# Patient Record
Sex: MALE | Race: BLACK OR AFRICAN AMERICAN | NOT HISPANIC OR LATINO | Employment: FULL TIME | URBAN - METROPOLITAN AREA
[De-identification: names, ages, dates, MRNs, and addresses within clinical notes are randomized per-mention and may not be internally consistent; named-entity substitution may affect disease eponyms.]

---

## 2021-05-28 ENCOUNTER — RECORDS - HEALTHEAST (OUTPATIENT)
Dept: ADMINISTRATIVE | Facility: CLINIC | Age: 46
End: 2021-05-28

## 2021-09-01 ENCOUNTER — APPOINTMENT (OUTPATIENT)
Dept: FAMILY MEDICINE | Facility: CLINIC | Age: 46
End: 2021-09-01
Payer: COMMERCIAL

## 2021-09-01 ENCOUNTER — LAB REQUISITION (OUTPATIENT)
Dept: LAB | Facility: CLINIC | Age: 46
End: 2021-09-01

## 2021-09-01 LAB — SARS-COV-2 RNA RESP QL NAA+PROBE: NEGATIVE

## 2021-09-01 PROCEDURE — U0003 INFECTIOUS AGENT DETECTION BY NUCLEIC ACID (DNA OR RNA); SEVERE ACUTE RESPIRATORY SYNDROME CORONAVIRUS 2 (SARS-COV-2) (CORONAVIRUS DISEASE [COVID-19]), AMPLIFIED PROBE TECHNIQUE, MAKING USE OF HIGH THROUGHPUT TECHNOLOGIES AS DESCRIBED BY CMS-2020-01-R: HCPCS | Performed by: INTERNAL MEDICINE

## 2021-10-16 ENCOUNTER — HEALTH MAINTENANCE LETTER (OUTPATIENT)
Age: 46
End: 2021-10-16

## 2021-11-24 ENCOUNTER — APPOINTMENT (OUTPATIENT)
Dept: FAMILY MEDICINE | Facility: CLINIC | Age: 46
End: 2021-11-24
Payer: COMMERCIAL

## 2021-11-24 ENCOUNTER — LAB REQUISITION (OUTPATIENT)
Dept: LAB | Facility: CLINIC | Age: 46
End: 2021-11-24

## 2021-11-24 LAB — SARS-COV-2 RNA RESP QL NAA+PROBE: NEGATIVE

## 2021-11-24 PROCEDURE — U0005 INFEC AGEN DETEC AMPLI PROBE: HCPCS | Performed by: INTERNAL MEDICINE

## 2021-12-30 ENCOUNTER — NURSE TRIAGE (OUTPATIENT)
Dept: NURSING | Facility: CLINIC | Age: 46
End: 2021-12-30
Payer: COMMERCIAL

## 2021-12-30 ENCOUNTER — VIRTUAL VISIT (OUTPATIENT)
Dept: INTERNAL MEDICINE | Facility: CLINIC | Age: 46
End: 2021-12-30
Payer: COMMERCIAL

## 2021-12-30 ENCOUNTER — LAB (OUTPATIENT)
Dept: FAMILY MEDICINE | Facility: CLINIC | Age: 46
End: 2021-12-30
Payer: COMMERCIAL

## 2021-12-30 DIAGNOSIS — J06.9 VIRAL URI WITH COUGH: ICD-10-CM

## 2021-12-30 DIAGNOSIS — J06.9 VIRAL URI WITH COUGH: Primary | ICD-10-CM

## 2021-12-30 LAB — SARS-COV-2 RNA RESP QL NAA+PROBE: NEGATIVE

## 2021-12-30 PROCEDURE — U0005 INFEC AGEN DETEC AMPLI PROBE: HCPCS

## 2021-12-30 PROCEDURE — U0003 INFECTIOUS AGENT DETECTION BY NUCLEIC ACID (DNA OR RNA); SEVERE ACUTE RESPIRATORY SYNDROME CORONAVIRUS 2 (SARS-COV-2) (CORONAVIRUS DISEASE [COVID-19]), AMPLIFIED PROBE TECHNIQUE, MAKING USE OF HIGH THROUGHPUT TECHNOLOGIES AS DESCRIBED BY CMS-2020-01-R: HCPCS

## 2021-12-30 PROCEDURE — 99213 OFFICE O/P EST LOW 20 MIN: CPT | Mod: TEL | Performed by: NURSE PRACTITIONER

## 2021-12-30 NOTE — PROGRESS NOTES
"Nicolas is a 46 year old who is being evaluated via a billable telephone visit.      What phone number would you like to be contacted at? [phone]  How would you like to obtain your AVS? [facility]    Assessment & Plan   Problem List Items Addressed This Visit     None      Visit Diagnoses     Viral URI with cough    -  Primary    Relevant Orders    Symptomatic; Unknown COVID-19 Virus (Coronavirus) by PCR                        No follow-ups on file.    Jenny Neil NP  [facility]    Bg Valenzuela is a 46 year old who presents for the following health issues     HPI       Concern for COVID-19  About how many days ago did these symptoms start? 5  Is this your first visit for this illness? Yes  In the 14 days before your symptoms started, have you had close contact with someone with COVID-19 (Coronavirus)? Yes, I have been in contact with someone who has COVID-19/Coronavirus (confirmed by lab test).  Do you have a fever or chills? Yes, I felt feverish or had chills  Are you having new or worsening difficulty breathing? No  Do you have new or worsening cough? Yes, it's a dry cough.   Have you had any new or unexplained body aches? YES    Have you experienced any of the following NEW symptoms?    Headache: YES    Sore throat: YES    Loss of taste or smell: No    Chest pain: No    Diarrhea: YES    Rash: No  What treatments have you tried? Advil  Who do you live with? Wife and children  Are you, or a household member, a healthcare worker or a ? YES  Do you live in a nursing home, group home, or shelter? No  Do you have a way to get food/medications if quarantined? Yes, I have a friend or family member who can help me.      Review of Systems   Unremarkable other than listed above and below         Objective    Vitals - Patient Reported  Weight (Patient Reported): 83.9 kg (185 lb)  Height (Patient Reported): 170.2 cm (5' 7\")  BMI (Based on Pt Reported Ht/Wt): " 28.97        Physical Exam   healthy, alert and no distress  PSYCH: Alert and oriented times 3; coherent speech, normal   rate and volume, able to articulate logical thoughts, able   to abstract reason, no tangential thoughts, no hallucinations   or delusions  His affect is normal  RESP: No cough, no audible wheezing, able to talk in full sentences  Remainder of exam unable to be completed due to telephone visits                Phone call duration: 3 minutes

## 2021-12-30 NOTE — TELEPHONE ENCOUNTER
Employee having ST and ha.    COVID 19 Nurse Triage Plan/Patient Instructions    Please be aware that novel coronavirus (COVID-19) may be circulating in the community. If you develop symptoms such as fever, cough, or SOB or if you have concerns about the presence of another infection including coronavirus (COVID-19), please contact your health care provider or visit [email or web address].     Disposition/Instructions    Virtual Visit with provider recommended. Reference Visit Selection Guide.    Thank you for taking steps to prevent the spread of this virus.  o Limit your contact with others.  o Wear a simple mask to cover your cough.  o Wash your hands well and often.    Resources    [facility]: About COVID-19: [email or web address]    CDC: What to Do If You're Sick: www.cdc.gov/coronavirus/2019-ncov/about/steps-when-sick.html    CDC: Ending Home Isolation: www.cdc.gov/coronavirus/2019-ncov/hcp/disposition-in-home-patients.html     CDC: Caring for Someone: www.cdc.gov/coronavirus/2019-ncov/if-you-are-sick/care-for-someone.html     [facility]: Interim Guidance for Hospital Discharge to Home: www.health.[facility].mn.us/diseases/coronavirus/hcp/hospdischarge.pdf    [facility] clinical trials (COVID-19 research studies): clinicalaffairs.[facility]-clinical-trials     Below are the COVID-19 hotlines at the [facility] of Health ([facility]). Interpreters are available.   o For health questions: Call 007-190-4971 or 1-381.407.5711 (7 a.m. to 7 p.m.)  o For questions about schools and childcare: Call 442-538-3382 or 1-733.829.8964 (7 a.m. to 7 p.m.)                     Reason for Disposition    [1] COVID-19 infection suspected by caller or triager AND [2] mild symptoms (cough, fever, or others) AND [3] negative COVID-19 rapid test    Additional Information    Negative: SEVERE difficulty breathing (e.g., struggling for each breath, speaks in single words)    Negative: Difficult to awaken or  acting confused (e.g., disoriented, slurred speech)    Negative: Bluish (or gray) lips or face now    Negative: Shock suspected (e.g., cold/pale/clammy skin, too weak to stand, low BP, rapid pulse)    Negative: Sounds like a life-threatening emergency to the triager    Negative: [1] COVID-19 exposure AND [2] no symptoms    Negative: COVID-19 vaccine reaction suspected (e.g., fever, headache, muscle aches) occurring 1 to 3 days after getting vaccine    Negative: COVID-19 vaccine, questions about    Negative: [1] Lives with someone known to have influenza (flu test positive) AND [2] flu-like symptoms (e.g., cough, runny nose, sore throat, SOB; with or without fever)    Negative: [1] Adult with possible COVID-19 symptoms AND [2] triager concerned about severity of symptoms or other causes    Negative: COVID-19 and breastfeeding, questions about    Negative: SEVERE or constant chest pain or pressure (Exception: mild central chest pain, present only when coughing)    Negative: MODERATE difficulty breathing (e.g., speaks in phrases, SOB even at rest, pulse 100-120)    Negative: [1] Headache AND [2] stiff neck (can't touch chin to chest)    Negative: MILD difficulty breathing (e.g., minimal/no SOB at rest, SOB with walking, pulse <100)    Negative: Chest pain or pressure    Negative: Patient sounds very sick or weak to the triager    Negative: Fever > 103 F (39.4 C)    Negative: [1] Fever > 101 F (38.3 C) AND [2] age > 60 years    Negative: [1] Fever > 100.0 F (37.8 C) AND [2] bedridden (e.g., nursing home patient, CVA, chronic illness, recovering from surgery)    Negative: HIGH RISK for severe COVID complications (e.g., age > 64 years, obesity with BMI > 25, pregnant, chronic lung disease or other chronic medical condition)  (Exception: Already seen by PCP and no new or worsening symptoms.)    Negative: [1] HIGH RISK patient AND [2] influenza is widespread in the community AND [3] ONE OR MORE respiratory symptoms: cough,  sore throat, runny or stuffy nose    Negative: [1] HIGH RISK patient AND [2] influenza exposure within the last 7 days AND [3] ONE OR MORE respiratory symptoms: cough, sore throat, runny or stuffy nose    Protocols used: CORONAVIRUS (COVID-19) DIAGNOSED OR [id number]-H-GO 8.25.2021

## 2022-01-02 ENCOUNTER — OFFICE VISIT (OUTPATIENT)
Dept: FAMILY MEDICINE | Facility: CLINIC | Age: 47
End: 2022-01-02
Payer: COMMERCIAL

## 2022-01-02 DIAGNOSIS — Z53.9 ERRONEOUS ENCOUNTER--DISREGARD: Primary | ICD-10-CM

## 2022-02-22 ENCOUNTER — TRANSFERRED RECORDS (OUTPATIENT)
Dept: MULTI SPECIALTY CLINIC | Facility: CLINIC | Age: 47
End: 2022-02-22

## 2022-05-23 ENCOUNTER — LAB REQUISITION (OUTPATIENT)
Dept: LAB | Facility: CLINIC | Age: 47
End: 2022-05-23

## 2022-05-23 LAB — SARS-COV-2 RNA RESP QL NAA+PROBE: POSITIVE

## 2022-05-23 PROCEDURE — U0003 INFECTIOUS AGENT DETECTION BY NUCLEIC ACID (DNA OR RNA); SEVERE ACUTE RESPIRATORY SYNDROME CORONAVIRUS 2 (SARS-COV-2) (CORONAVIRUS DISEASE [COVID-19]), AMPLIFIED PROBE TECHNIQUE, MAKING USE OF HIGH THROUGHPUT TECHNOLOGIES AS DESCRIBED BY CMS-2020-01-R: HCPCS | Performed by: INTERNAL MEDICINE

## 2022-10-01 ENCOUNTER — HEALTH MAINTENANCE LETTER (OUTPATIENT)
Age: 47
End: 2022-10-01

## 2022-11-22 ENCOUNTER — TELEPHONE (OUTPATIENT)
Dept: FAMILY MEDICINE | Facility: CLINIC | Age: 47
End: 2022-11-22

## 2022-11-22 NOTE — TELEPHONE ENCOUNTER
Call patient and notified him of provider's response. He verbalized understanding.     Alba Gordon RN   [facility]

## 2022-11-22 NOTE — TELEPHONE ENCOUNTER
Noted. As long as patient feels like he can handle his breathing at home then should keep appointment for tomorrow.   Naomie Ordaz PA-C

## 2022-11-22 NOTE — TELEPHONE ENCOUNTER
Called patient. He is having a lot of wheezing. Has hx of asthma. He normally does not need to use his albuterol rescue inhaler or nebulizer. Any movement such as walking causes shortness of breath, he has to stop to catch his breath. States that he has had these episodes in the past and usually given Z-romaine. Inhaler and neb not helping. This is the fourth week and the wheezing is waking him up at night. He declines any chest pain, maybe a little bit of chest tightness. He has a lot of congestion. No fever. Little bit of cough. Pt states that he works for [employer] and tests every week for covid-19. His last test was negative. Pt states that he tried going to  and no one is taking patients. He went to 3 UC's and they turned him away. [facility] and [facility]. Took 6 hours and no openings. There is a line out the door with a mandatory 3 hour wait.     Alba Gordon RN   [facility]

## 2022-11-22 NOTE — TELEPHONE ENCOUNTER
Please call and triage patient.     Patient is new to me and scheduled for asthma and chest pain tomorrow.   Naomie Ordaz PA-C

## 2022-11-23 ENCOUNTER — OFFICE VISIT (OUTPATIENT)
Dept: FAMILY MEDICINE | Facility: CLINIC | Age: 47
End: 2022-11-23
Payer: COMMERCIAL

## 2022-11-23 VITALS
BODY MASS INDEX: 29.35 KG/M2 | HEIGHT: 66 IN | WEIGHT: 182.6 LBS | HEART RATE: 78 BPM | DIASTOLIC BLOOD PRESSURE: 74 MMHG | SYSTOLIC BLOOD PRESSURE: 129 MMHG | TEMPERATURE: 98.2 F | OXYGEN SATURATION: 98 %

## 2022-11-23 DIAGNOSIS — J45.21 MILD INTERMITTENT ASTHMA WITH ACUTE EXACERBATION: Primary | ICD-10-CM

## 2022-11-23 PROBLEM — J45.20 MILD INTERMITTENT ASTHMA WITHOUT COMPLICATION: Status: ACTIVE | Noted: 2022-11-23

## 2022-11-23 PROCEDURE — 99204 OFFICE O/P NEW MOD 45 MIN: CPT | Performed by: PHYSICIAN ASSISTANT

## 2022-11-23 RX ORDER — PREDNISONE 20 MG/1
TABLET ORAL
Qty: 20 TABLET | Refills: 0 | Status: SHIPPED | OUTPATIENT
Start: 2022-11-23 | End: 2023-05-11

## 2022-11-23 RX ORDER — ALBUTEROL SULFATE 90 UG/1
2 AEROSOL, METERED RESPIRATORY (INHALATION)
COMMUNITY
Start: 2022-06-02 | End: 2022-11-23

## 2022-11-23 RX ORDER — ALBUTEROL SULFATE 90 UG/1
2 AEROSOL, METERED RESPIRATORY (INHALATION) EVERY 4 HOURS PRN
Qty: 18 G | Refills: 3 | Status: SHIPPED | OUTPATIENT
Start: 2022-11-23 | End: 2023-01-04

## 2022-11-23 ASSESSMENT — ASTHMA QUESTIONNAIRES
QUESTION_2 LAST FOUR WEEKS HOW OFTEN HAVE YOU HAD SHORTNESS OF BREATH: MORE THAN ONCE A DAY
ACT_TOTALSCORE: 8
QUESTION_1 LAST FOUR WEEKS HOW MUCH OF THE TIME DID YOUR ASTHMA KEEP YOU FROM GETTING AS MUCH DONE AT WORK, SCHOOL OR AT HOME: SOME OF THE TIME
ACT_TOTALSCORE: 8
QUESTION_3 LAST FOUR WEEKS HOW OFTEN DID YOUR ASTHMA SYMPTOMS (WHEEZING, COUGHING, SHORTNESS OF BREATH, CHEST TIGHTNESS OR PAIN) WAKE YOU UP AT NIGHT OR EARLIER THAN USUAL IN THE MORNING: FOUR OR MORE NIGHTS A WEEK
QUESTION_5 LAST FOUR WEEKS HOW WOULD YOU RATE YOUR ASTHMA CONTROL: NOT CONTROLLED AT ALL
QUESTION_4 LAST FOUR WEEKS HOW OFTEN HAVE YOU USED YOUR RESCUE INHALER OR NEBULIZER MEDICATION (SUCH AS ALBUTEROL): ONE OR TWO TIMES PER DAY

## 2022-11-23 NOTE — PROGRESS NOTES
"  Assessment & Plan     Mild intermittent asthma with acute exacerbation  - Acute asthma exacerbation of unknown origin. Refilled patient's albuterol prescription as his current inhaler was . Antibiotics not needed at this time due to lack of infection symptoms. Prednisone taper to assist in opening airways and reducing inflammation. Patient education given to return if symptoms worsen or do not resolve following prednisone treatment.   - albuterol (PROAIR HFA/PROVENTIL HFA/VENTOLIN HFA) 108 (90 Base) MCG/ACT inhaler; Inhale 2 puffs into the lungs every 4 hours as needed for shortness of breath / dyspnea or wheezing  - predniSONE (DELTASONE) 20 MG tablet; Take 3 tabs by mouth daily x 3 days, then 2 tabs daily x 3 days, then 1 tab daily x 3 days, then 1/2 tab daily x 3 days.             BMI:   Estimated body mass index is 29.7 kg/m  as calculated from the following:    Height as of this encounter: 1.67 m (5' 5.75\").    Weight as of this encounter: 82.8 kg (182 lb 9.6 oz).    Not addressed at this visit.         No follow-ups on file.    YAZMIN Govea PA-C  [facility]  The student RAJESH Govea acted as a scribe and the encounter documented above was completely performed by myself and the documentation reflects the work I have performed today.   Naomie Ordaz PA-C       Subjective   Nicolas is a 47 year old, presenting for the following health issues:  Asthma and Health Maintenance      History of Present Illness     Asthma:  He presents for follow up of asthma.  He has some cough, some wheezing, and some shortness of breath. He is using a relief medication every 4 hours. He does not have a controller medication. Patient is aware of the following triggers: unaware of any triggers. The patient has not had a visit to the Emergency Room, Urgent Care or Hospital due to asthma since the last clinic visit.       Nicolas was diagnosed with asthma in his late 30s. " "He has a family history of childhood onset asthma in his brother. Nicolas has an albuterol 90 mcg inhaler that he typically uses only once per year. Three weeks ago Nicolas noticed his asthma symptoms were worsening to include more coughing, wheezing and chest tightness. He had no changes to his environment and had no known sick contacts at the time. Nicolas gets tested for COVID once weekly at work and has tested negative for the last three weeks. Nicolas gets short of breath upon talking or walking for longer periods of time. He has a dry cough with no sputum output. He is noticing more congestion and chest tightness. His symptoms are worse in the evening and wake him unit(s)p 4-5 times a night. He has been using his albuterol inhaler three times a day with little to no relief. He denies ear pain or hearing loss, fever, and a sore throat. Patient denies recent travel and recent leg pain.           Review of Systems   As above.         Objective    /74 (BP Location: Right arm, Patient Position: Chair, Cuff Size: Adult Large)   Pulse 78   Temp 98.2  F (36.8  C) (Oral)   Ht 1.67 m (5' 5.75\")   Wt 82.8 kg (182 lb 9.6 oz)   SpO2 98%   BMI 29.70 kg/m    Body mass index is 29.7 kg/m .  Physical Exam  Constitutional:       General: He is not in acute distress.     Appearance: Normal appearance.   HENT:      Right Ear: Tympanic membrane, ear canal and external ear normal.      Left Ear: Tympanic membrane, ear canal and external ear normal.      Mouth/Throat:      Mouth: Mucous membranes are moist.      Pharynx: Oropharynx is clear.   Eyes:      General:         Right eye: No discharge.         Left eye: No discharge.      Comments: Eyes grossly normal. Negative for erythema and discharge.    Cardiovascular:      Rate and Rhythm: Normal rate and regular rhythm.   Pulmonary:      Effort: No respiratory distress.      Breath sounds: Examination of the right-middle field reveals wheezing. Examination of the left-middle " field reveals wheezing. Examination of the right-lower field reveals wheezing. Examination of the left-lower field reveals wheezing. Decreased breath sounds and wheezing (Expiratory wheezing bilaterally. ) present.   Lymphadenopathy:      Cervical: No cervical adenopathy.   Neurological:      Mental Status: He is alert.   Psychiatric:         Mood and Affect: Mood normal.         Behavior: Behavior normal.      No retractions and patient able to speak in complete sentences. Coughing with deep breaths.

## 2023-01-04 DIAGNOSIS — J45.21 MILD INTERMITTENT ASTHMA WITH ACUTE EXACERBATION: Primary | ICD-10-CM

## 2023-01-04 RX ORDER — ALBUTEROL SULFATE 90 UG/1
2 AEROSOL, METERED RESPIRATORY (INHALATION) EVERY 4 HOURS PRN
Qty: 18 G | Refills: 3 | Status: SHIPPED | OUTPATIENT
Start: 2023-01-04 | End: 2023-05-11

## 2023-01-04 RX ORDER — FLUTICASONE PROPIONATE 110 UG/1
1 AEROSOL, METERED RESPIRATORY (INHALATION) 2 TIMES DAILY
Qty: 12 G | Refills: 1 | Status: SHIPPED | OUTPATIENT
Start: 2023-01-04 | End: 2023-05-11

## 2023-01-04 NOTE — TELEPHONE ENCOUNTER
Patient should start a daily steroid inhaler and be seen in clinic within 2 weeks. Any available provider. If shortness of breath acutely worsens he needs to be seen urgently. RN can send medication to preferred pharmacy. At this time I would try this before sending to pulmonology as this is the usual next step in treatment.   Naomie Ordaz PA-C

## 2023-01-04 NOTE — TELEPHONE ENCOUNTER
"Naomie,     Patient called regarding visit 11/23/22. Per pt stated asthma symptoms got better but not back again and worse. Per pt \"I was told to call back if my symptoms don't get better so I can get a medication that I would take daily to help and I think I might even need another dose of prednisone\". Pt also stated he would be willing to see pulmonology if you feel that he should.       [phone], ok for detailed vm.     Thanks,  ROBER Chawla  [facility]     "

## 2023-01-04 NOTE — TELEPHONE ENCOUNTER
Naomie,     Please route back to me only. I forgot to mention that pt stated he needs refill on his albuterol too. Cued if ok per provider.  I will call him after your route back with message below.     Thanks,  ROBER Chawla  [facility]

## 2023-02-05 ENCOUNTER — HEALTH MAINTENANCE LETTER (OUTPATIENT)
Age: 48
End: 2023-02-05

## 2023-04-03 ENCOUNTER — TELEPHONE (OUTPATIENT)
Dept: FAMILY MEDICINE | Facility: CLINIC | Age: 48
End: 2023-04-03

## 2023-04-03 NOTE — TELEPHONE ENCOUNTER
Patient called reporting stuffy nose from dog sitting.  Does not have symptoms prior to dog sitting.  Has tried Benadryl with little to no relief.  Seeking advise.  RN advised to try OTC Zyrtec or Claritin as directed.  If no improve, call back to clinic for further recommendation.  Patient agreed.    PRESTON Vazquez, RN  [facility]

## 2023-05-11 ENCOUNTER — OFFICE VISIT (OUTPATIENT)
Dept: FAMILY MEDICINE | Facility: CLINIC | Age: 48
End: 2023-05-11
Payer: COMMERCIAL

## 2023-05-11 ENCOUNTER — TELEPHONE (OUTPATIENT)
Dept: FAMILY MEDICINE | Facility: CLINIC | Age: 48
End: 2023-05-11

## 2023-05-11 VITALS
WEIGHT: 185.8 LBS | BODY MASS INDEX: 29.16 KG/M2 | DIASTOLIC BLOOD PRESSURE: 79 MMHG | SYSTOLIC BLOOD PRESSURE: 139 MMHG | RESPIRATION RATE: 22 BRPM | OXYGEN SATURATION: 96 % | HEIGHT: 67 IN | HEART RATE: 84 BPM

## 2023-05-11 DIAGNOSIS — J45.41 MODERATE PERSISTENT ASTHMA WITH EXACERBATION: Primary | ICD-10-CM

## 2023-05-11 DIAGNOSIS — J30.81 ALLERGIC RHINITIS DUE TO ANIMALS: ICD-10-CM

## 2023-05-11 DIAGNOSIS — J45.30 MILD PERSISTENT ASTHMA, UNSPECIFIED WHETHER COMPLICATED: Primary | ICD-10-CM

## 2023-05-11 PROCEDURE — 99214 OFFICE O/P EST MOD 30 MIN: CPT | Performed by: NURSE PRACTITIONER

## 2023-05-11 RX ORDER — PREDNISONE 20 MG/1
40 TABLET ORAL DAILY
Qty: 10 TABLET | Refills: 0 | Status: SHIPPED | OUTPATIENT
Start: 2023-05-11 | End: 2023-05-16

## 2023-05-11 RX ORDER — ALBUTEROL SULFATE 90 UG/1
2 AEROSOL, METERED RESPIRATORY (INHALATION) EVERY 6 HOURS PRN
Qty: 18 G | Refills: 3 | Status: SHIPPED | OUTPATIENT
Start: 2023-05-11

## 2023-05-11 RX ORDER — FLUTICASONE PROPIONATE AND SALMETEROL 250; 50 UG/1; UG/1
1 POWDER RESPIRATORY (INHALATION) EVERY 12 HOURS
Qty: 1 EACH | Refills: 3 | Status: SHIPPED | OUTPATIENT
Start: 2023-05-11 | End: 2023-06-10

## 2023-05-11 RX ORDER — FLUTICASONE PROPIONATE 110 UG/1
1 AEROSOL, METERED RESPIRATORY (INHALATION) 2 TIMES DAILY
Qty: 12 G | Refills: 2 | Status: SHIPPED | OUTPATIENT
Start: 2023-05-11 | End: 2023-05-11

## 2023-05-11 ASSESSMENT — ENCOUNTER SYMPTOMS
SINUS PRESSURE: 1
VOMITING: 0
EYE ITCHING: 0
EYE DISCHARGE: 0
NAUSEA: 0
SLEEP DISTURBANCE: 1
CHILLS: 0
EYE REDNESS: 0
FATIGUE: 1
SORE THROAT: 0
FEVER: 0
SHORTNESS OF BREATH: 1
WHEEZING: 1
EYE PAIN: 0
MYALGIAS: 0
HEADACHES: 0
PHOTOPHOBIA: 0
COUGH: 1
CHEST TIGHTNESS: 1

## 2023-05-11 NOTE — PATIENT INSTRUCTIONS
Speak to the pharmacist about how to use this medication when you pick it up and also you may watch videos on the Advair website or YouTube on how to use the Advair discus appropriately.    After you use the medication be sure to rinse your mouth out with water and spit it out to prevent steroid medicine from sitting on your tongue.  If you do not do this, you can get thrush which is a white patch on your tongue that will need to be treated.    May also use your regular albuterol inhaler 2 puffs up to 4 times daily if needed as a rescue inhaler.  Ideally, you would not need to use your rescue inhaler very frequently if you are taking your Advair regularly.  Take this daily even if you are feeling well.    Recheck  after prednisone is done, about 7 days or if you're not better after 1-2 days.      Take 1/2 dose prednisone (20 mg) now and start 40 mg tomorrow morning.      Religiously use your Flonase 2 sprays in each nostril daily in the evening.  Okay for daily Claritin or generic once a day at the time easiest remember to take it.     If you are still very congested with these, discussed at your follow-up appointment.  You may need an allergy referral if you are not better.    Treating your allergies does also reduce the severity of your asthma

## 2023-05-11 NOTE — TELEPHONE ENCOUNTER
Please return call: Dr. Sanchez has sent a prescription for 3 months for the flovent as this is a chronic medication.  For the prednisone medication, patient will have to be seen, evaluated, and treated based on his current symptoms.    Orders placed as requested.     Jayme Sanchez MD  [facility]  5/11/2023  1:50 PM    Diagnoses and all orders for this visit:    Mild persistent asthma, unspecified whether complicated  -     fluticasone (FLOVENT HFA) 110 MCG/ACT inhaler; Inhale 1 puff into the lungs 2 times daily

## 2023-05-11 NOTE — TELEPHONE ENCOUNTER
Patient Returning Call    Reason for call:  Pt states Dr. Sanchez is his provider, however, pt has not seen Dr. Sanchez at [facility] (since coming).  Pt would like his prednisone refilled by Dr. Sanchez and a steroid inhaler.  The flovent cost over $100 and per pt is too expensive.     TC relayed pt would need to be seen as unsure if provider would order meds without an appt.  Pt then asked if provider could call him please.  TC relayed provider is seeing pt and will send message to him.      Information relayed to patient:  Understood    Patient has additional questions:  No    [facility] or would you prefer to receive a phone call?:   Patient would prefer a phone call     Okay to leave a detailed message?: Yes at Home number on file [phone] (home)    Call taken 5/11/23 at 1138 am by CHARLEE Hawk

## 2023-05-11 NOTE — PROGRESS NOTES
Assessment & Plan     Moderate persistent asthma with exacerbation    - fluticasone-salmeterol (ADVAIR) 250-50 MCG/ACT inhaler  Dispense: 1 each; Refill: 3  - Optichamber/Spacer Order for DME - ONLY FOR DME  - predniSONE (DELTASONE) 20 MG tablet  Dispense: 10 tablet; Refill: 0  - albuterol (PROAIR HFA/PROVENTIL HFA/VENTOLIN HFA) 108 (90 Base) MCG/ACT inhaler  Dispense: 18 g; Refill: 3    Allergic rhinitis due to animals     -Continue Claritin OTC.  Just picked this up today.    Ongoing asthma symptoms, unrelieved, that has been ongoing since November.  Patient was seen in clinic on 11/23 and was prescribed albuterol inhaler and prednisone which resolved symptoms.  However, patient states that symptoms flared again in February and he is now experiencing a dry cough, congestion, chest tightness, and shortness of breath with activity and rest.  + Wheezing throughout on exam.     Recently has been using his albuterol inhaler once per hour.  He was using a Flovent inhaler daily until about a week when he ran out.  Also costly co-pay of over $100.    He reports hx of seasonal allergies and allergies to animals. He has been taking OTC Claritin and flonase with no relief in wheezing and SOB symptoms.  Explained correlation between untreated allergies or undertreated allergies and worsening asthma symptoms.  Recommended 2 sprays in each nostril daily of Flonase from home supply religiously until feeling better and then may reduce to 1 spray in each nostril daily.     Due to no relief of asthma symptoms with plain Flovent 110 mcg dose and extremely frequent albuterol needs, will try Advair Diskus 250/50 with short prednisone burst to relieve symptoms currently.  Discussed options with pharmacist who ran several scenarios and Advair Diskus is $17 per month.  Patient is agreeable to taking this.    Advised to take 20mg of prednisone with Advair today as it is later in the day and then started Prednisone 40mg tomorrow.  Patient  is familiar with how to use Diskus inhalers and has in the past.  May use YouTube instructions or talk to pharmacist for further information.    Recheck if shortness of breath not relieved in the next 1 to 2 days or new fevers develop.  Rest.  Otherwise, follow-up in about 7 days if improving for recheck of lungs and asthma recheck.        30 minutes spent by me on the date of the encounter doing chart review, patient visit, documentation and discussion with other provider(s)         Return in about 1 week (around 5/18/2023) for If no better.    Bernice Bosch, [facility] PACHECO Valenzuela is a 47 year old male who presents to clinic today for the following health issues:  Chief Complaint   Patient presents with     Asthma     Has been having asthma flare up for last few months     HPI    Dry cough, congestion, chest tightness, SOB, and wheezing ongoing for since November 2022. Was seen in clinic on 11/23 and received prescription for albuterol inhaler and prednisone which resolved symptoms; however, symptoms returned in February 2023.     Waking every hour at night due to SOB and wheezing..     +Right ear fullness and itchy.. Rates 2/10.     Reports using Albuterol inhaler every hour. States to going through an inhaler in 1 week. Previously only needed one albuterol a year. Last dose 30 minutes ago.     Flovent previously prescribed. States to not being effective. Ran out 1 week ago . New prescription placed today, but reports does not want to purchase again. Wants new prescription for prednisone.     Hx of seasonal allergies. Taking Claritin daily.   +allergies to animals. States to being around family [place] more recently. Has had dog for 2+ years.    Hx of 1/2 pack/day smoking. Quit 3 years ago .     Weekly negative COVID testing through work.           Review of Systems   Constitutional: Positive for fatigue. Negative for chills and fever.   HENT: Positive for  "congestion and sinus pressure (frontal ). Negative for postnasal drip and sore throat.    Eyes: Negative for photophobia, pain, discharge, redness, itching and visual disturbance.   Respiratory: Positive for cough (dry ), chest tightness, shortness of breath (with rest and activity) and wheezing.    Cardiovascular: Negative for chest pain.   Gastrointestinal: Negative for nausea and vomiting.   Musculoskeletal: Negative for myalgias.   Allergic/Immunologic: Positive for environmental allergies.   Neurological: Negative for headaches.   Psychiatric/Behavioral: Positive for sleep disturbance.           Objective    /79 (BP Location: Right arm, Patient Position: Sitting, Cuff Size: Adult Large)   Pulse 84   Resp 22   Ht 1.702 m (5' 7\")   Wt 84.3 kg (185 lb 12.8 oz)   SpO2 96%   BMI 29.10 kg/m    Physical Exam  Constitutional:       General: He is awake. He is not in acute distress.     Appearance: Normal appearance. He is well-developed.   HENT:      Head: Normocephalic and atraumatic.      Right Ear: Hearing, tympanic membrane, ear canal and external ear normal.      Left Ear: Hearing, tympanic membrane, ear canal and external ear normal.      Nose:      Right Turbinates: Swollen and pale.      Left Turbinates: Swollen and pale.      Right Sinus: Frontal sinus tenderness present. No maxillary sinus tenderness.      Left Sinus: Frontal sinus tenderness present. No maxillary sinus tenderness.      Mouth/Throat:      Lips: Pink.      Mouth: Mucous membranes are moist.      Pharynx: Oropharynx is clear. Uvula midline. Posterior oropharyngeal erythema present.      Tonsils: 1+ on the right. 1+ on the left.   Pulmonary:      Effort: Tachypnea and respiratory distress present.      Breath sounds: Wheezing present.      Comments: Course wheezing throughout with audible wheezing  Lymphadenopathy:      Cervical: No cervical adenopathy.   Neurological:      General: No focal deficit present.      Mental Status: He is " alert and oriented to person, place, and time.   Psychiatric:         Attention and Perception: Attention normal.         Mood and Affect: Mood normal.         Behavior: Behavior is cooperative.

## 2023-05-30 NOTE — TELEPHONE ENCOUNTER
Advised patient per MD. The patient was not cooperative with the process at all once he was informed MD would not be prescribing Prednisone.     Patient would not allow author to find an appropriate appointment time. Patient was becoming very frustrated. Author was unable to assist the patient due to the behavior of the patient during our call. Patient asks to speak with MD directly and support staff agrees.     Patient declined Walk-In Care and stated that he has put seeking care off too long and now is having difficulty sleeping so not this is urgent. Author advised the patient that his failure to seek appropriate care and evaluation is not the responsibility of the provider or clinic.     Routing to PCP for review.    No protocol  Last ov 07/21/22  Last fill 05/01/2023  Does have future appt scheduled

## 2023-06-06 ENCOUNTER — OFFICE VISIT (OUTPATIENT)
Dept: INTERNAL MEDICINE | Facility: CLINIC | Age: 48
End: 2023-06-06
Payer: COMMERCIAL

## 2023-06-06 VITALS
DIASTOLIC BLOOD PRESSURE: 80 MMHG | OXYGEN SATURATION: 95 % | BODY MASS INDEX: 28.9 KG/M2 | SYSTOLIC BLOOD PRESSURE: 108 MMHG | WEIGHT: 184.1 LBS | HEART RATE: 89 BPM | TEMPERATURE: 98.2 F | HEIGHT: 67 IN

## 2023-06-06 DIAGNOSIS — J45.30 MILD PERSISTENT ASTHMA, UNSPECIFIED WHETHER COMPLICATED: Primary | ICD-10-CM

## 2023-06-06 PROCEDURE — 99214 OFFICE O/P EST MOD 30 MIN: CPT | Performed by: INTERNAL MEDICINE

## 2023-06-06 ASSESSMENT — ASTHMA QUESTIONNAIRES
ACT_TOTALSCORE: 16
QUESTION_3 LAST FOUR WEEKS HOW OFTEN DID YOUR ASTHMA SYMPTOMS (WHEEZING, COUGHING, SHORTNESS OF BREATH, CHEST TIGHTNESS OR PAIN) WAKE YOU UP AT NIGHT OR EARLIER THAN USUAL IN THE MORNING: TWO OR THREE NIGHTS A WEEK
EMERGENCY_ROOM_LAST_YEAR_TOTAL: TWO
QUESTION_1 LAST FOUR WEEKS HOW MUCH OF THE TIME DID YOUR ASTHMA KEEP YOU FROM GETTING AS MUCH DONE AT WORK, SCHOOL OR AT HOME: A LITTLE OF THE TIME
QUESTION_2 LAST FOUR WEEKS HOW OFTEN HAVE YOU HAD SHORTNESS OF BREATH: ONCE OR TWICE A WEEK
QUESTION_4 LAST FOUR WEEKS HOW OFTEN HAVE YOU USED YOUR RESCUE INHALER OR NEBULIZER MEDICATION (SUCH AS ALBUTEROL): TWO OR THREE TIMES PER WEEK
ACT_TOTALSCORE: 16
QUESTION_5 LAST FOUR WEEKS HOW WOULD YOU RATE YOUR ASTHMA CONTROL: SOMEWHAT CONTROLLED

## 2023-06-06 ASSESSMENT — PAIN SCALES - GENERAL: PAINLEVEL: NO PAIN (0)

## 2023-06-06 NOTE — PROGRESS NOTES
"  Assessment & Plan   Problem List Items Addressed This Visit        Respiratory    Mild persistent asthma, unspecified whether complicated - Primary     Patient presents for follow up of asthma. Had exacerbation that he was seen at [facility] for 5/11/23, improved with prednisone. Overall control had improved with Advair but still noting symptoms at night and needing daily albuterol.   - Increase Advair to BID  - Continue PRN albuterol   - F/u with me in 2 months            Prescription drug management  I spent a total of 32 minutes on the day of the visit.   Time spent by me doing chart review, history and exam, documentation and further activities per the note     BMI:   Estimated body mass index is 29.27 kg/m  as calculated from the following:    Height as of this encounter: 1.689 m (5' 6.5\").    Weight as of this encounter: 83.5 kg (184 lb 1.6 oz).   Weight management plan: Discussed healthy diet and exercise guidelines    FUTURE APPOINTMENTS:       - Follow-up visit in 2 months for routine physical    Diana Suha Navarro MD  [facility] PACHECO Valenzuela is a 47 year old, presenting for the following health issues:  Establish Care and RECHECK (From walk in care 5-11 )        6/6/2023     3:49 PM   Additional Questions   Roomed by KATIE Velez   Accompanied by n/a       History of Present Illness      Asthma Follow-Up    Was ACT completed today?  Yes        6/6/2023     3:43 PM   ACT Total Scores   ACT TOTAL SCORE (Goal Greater than or Equal to 20) 16   In the past 12 months, how many times did you visit the emergency room for your asthma without being admitted to the hospital? 2   In the past 12 months, how many times were you hospitalized overnight because of your asthma? 0       Please describe any recent triggers for your asthma: animal dander, humidity and air quality    Have you had any Emergency Room Visits, Urgent Care Visits, or Hospital Admissions since your last office " "visit?  Yes  Number of ER or Urgent Care visits for asthma: twice in the last year    Was seen in walk-in care 5/11 with acute asthma exacerbation in the setting of being out of Flovent.  Was using albuterol once per hour.  They did start him on Advair discus 250/50 BID with a short prednisone burst to relieve symptoms.    Today he says prednisone resolved significant shortness of breath and daytime wheezing. Advair is helping, only using once a day (was unaware it was a BID med). Still using albuterol once most evenings when wheezing starts again. Able to walk at work without issue (works for [employer] and walks a lot).     Asthma initially triggered by cabin with mice and cats in his late 20s. Worse recently with increasing allergies since they got a [place] Melo. Claritin or zyrtec daily. Flonase at night.    Back surgery in early 20s and L shoulder (rotator cuff surgery) 4 years ago.     H/o pre-DM.     Colonoscopy 2/22/22. Three TAs and one HP. Plan repeat in 3 years.     He eats 0-1 servings of fruits and vegetables daily.He consumes 0 sweetened beverage(s) daily.He exercises with enough effort to increase his heart rate 10 to 19 minutes per day.  He exercises with enough effort to increase his heart rate 4 days per week.   He is taking medications regularly.    Review of Systems   Constitutional, HEENT, cardiovascular, pulmonary, gi and gu systems are negative, except as otherwise noted.      Objective    /80 (BP Location: Right arm, Patient Position: Sitting, Cuff Size: Adult Regular)   Pulse 89   Temp 98.2  F (36.8  C) (Oral)   Ht 1.689 m (5' 6.5\")   Wt 83.5 kg (184 lb 1.6 oz)   SpO2 95%   BMI 29.27 kg/m    Body mass index is 29.27 kg/m .  Physical Exam   GENERAL: healthy, alert and no distress  EYES: Eyes grossly normal to inspection and conjunctivae and sclerae normal  HENT: nose and mouth without ulcers or lesions  RESP: lungs clear to auscultation - no rales, rhonchi or wheezes  CV: " regular rate and rhythm, normal S1 S2, no S3 or S4, no murmur, click or rub, no peripheral edema and peripheral pulses strong  ABDOMEN: soft, nontender  MS: no gross musculoskeletal defects noted, no edema  SKIN: no suspicious lesions or rashes on exposed skin   NEURO: Normal strength and tone, mentation intact and speech normal  PSYCH: mentation appears normal, affect normal/bright

## 2023-06-06 NOTE — PATIENT INSTRUCTIONS
Can get off brand zyrtec (cetirizine) 10 mg daily and/or allegra (fexofenadine 180 mg).     Wild med saline sinus rinse daily.     Advair should be every 12 hours, morning and night.

## 2023-06-06 NOTE — Clinical Note
Please abstract the following data from this visit with this patient into the appropriate field in Epic:  Tests that can be patient reported without a hard copy:  {Quality Abstract List (Optional):840319}  Other Tests found in the patient's chart through Chart Review/Care Everywhere:  Colonoscopy done by this group [facility] on this date: 2/22/23  Note to Abstraction: If this section is blank, no results were found via Chart Review/Care Everywhere.

## 2023-06-07 NOTE — ASSESSMENT & PLAN NOTE
Patient presents for follow up of asthma. Had exacerbation that he was seen at [facility] for 5/11/23, improved with prednisone. Overall control had improved with Advair but still noting symptoms at night and needing daily albuterol.   - Increase Advair to BID  - Continue PRN albuterol   - F/u with me in 2 months

## 2023-07-09 ENCOUNTER — OFFICE VISIT (OUTPATIENT)
Dept: FAMILY MEDICINE | Facility: CLINIC | Age: 48
End: 2023-07-09
Payer: OTHER MISCELLANEOUS

## 2023-07-09 VITALS
OXYGEN SATURATION: 96 % | RESPIRATION RATE: 20 BRPM | TEMPERATURE: 98 F | DIASTOLIC BLOOD PRESSURE: 84 MMHG | SYSTOLIC BLOOD PRESSURE: 119 MMHG | HEART RATE: 92 BPM

## 2023-07-09 DIAGNOSIS — H61.23 BILATERAL IMPACTED CERUMEN: ICD-10-CM
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
## 2023-07-09 DIAGNOSIS — S50.862A INSECT BITE OF LEFT FOREARM, INITIAL ENCOUNTER: Primary | ICD-10-CM

## 2023-07-09 PROCEDURE — 69209 REMOVE IMPACTED EAR WAX UNI: CPT | Mod: 50 | Performed by: STUDENT IN AN ORGANIZED HEALTH CARE EDUCATION/TRAINING PROGRAM

## 2023-07-09 PROCEDURE — 99213 OFFICE O/P EST LOW 20 MIN: CPT | Mod: 25 | Performed by: STUDENT IN AN ORGANIZED HEALTH CARE EDUCATION/TRAINING PROGRAM

## 2023-07-09 RX ORDER — CEPHALEXIN 500 MG/1
500 CAPSULE ORAL 4 TIMES DAILY
Qty: 20 CAPSULE | Refills: 0 | Status: SHIPPED | OUTPATIENT
Start: 2023-07-09

## 2023-07-09 RX ORDER — MUPIROCIN 20 MG/G
OINTMENT TOPICAL 3 TIMES DAILY
Qty: 15 G | Refills: 0 | Status: SHIPPED | OUTPATIENT
Start: 2023-07-09 | End: 2023-07-09

## 2023-07-09 RX ORDER — MUPIROCIN 20 MG/G
OINTMENT TOPICAL 3 TIMES DAILY
Qty: 15 G | Refills: 0 | Status: SHIPPED | OUTPATIENT
Start: 2023-07-09

## 2023-07-09 RX ORDER — CEPHALEXIN 500 MG/1
500 CAPSULE ORAL 4 TIMES DAILY
Qty: 20 CAPSULE | Refills: 0 | Status: SHIPPED | OUTPATIENT
Start: 2023-07-09 | End: 2023-07-09

## 2023-07-09 NOTE — PROGRESS NOTES
Assessment & Plan     Insect bite of left forearm, initial encounter  Reassured by normal vitals, no systemic symptoms of infection, denies fevers. Plan to treat with keflex and Bactroban for localized skin infection given localized bite erythema and pain. Counseled on presenting for reevaluation if erythema spreads, if he develops fevers/chills. No current suspicion for systemic/tick borne infection given lack of fevers, myalgias, joint pain. Counseled on these symptoms as well.   - cephALEXin (KEFLEX) 500 MG capsule  Dispense: 20 capsule; Refill: 0  - mupirocin (BACTROBAN) 2 % external ointment  Dispense: 15 g; Refill: 0    Bilateral impacted cerumen  B/l cerumen disimpaction with irrigation, TMs clear. With pain improving s/p irrigation and centor score of 1, will not further work up sore throat/ear pain at this time.  - DE REMOVAL IMPACTED CERUMEN IRRIGATION/LVG UNILAT      Return in about 2 weeks (around 7/23/2023), or if symptoms worsen or fail to improve.    Pari Stanton MD  [facility]    Addendum  Call from patient-pharmacy closed, new pharmacy entered and medication ordred to new pharmacy as noted.    Subjective   Nicolas is a 47 year old, presenting for the following health issues:  Insect Bites (Left Forearm)    HPI     Noted have insect bites on L forearm.  Bite on Thursday.  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  Now has erythema and swelling at the site of the bite.    Has ear pain and throat pain as well  Maybe slightly more fatigued.  Started late Friday night.  Worsening over the last few days.    Has been having trouble sleeping due to pain in ear and irritation of throat.    No fevers  No known sick contacts.  No myalgias.    No nausea or vomiting.  Having some loose stools.    Decreased appetite.  Able to drink water.    Review of Systems    Constitutional, HEENT, cardiovascular, pulmonary, gi and gu systems are negative, except as otherwise noted.      Objective    /84   Pulse 92   Temp 98  F (36.7  C) (Tympanic)   Resp 20   SpO2 96%   There is no height or weight on file to calculate BMI.  Physical Exam   GENERAL: healthy, alert and no distress  EYES: Eyes grossly normal to inspection, PERRL and conjunctivae and sclerae normal  HENT: ear canals b/l impacted cerumen, TMs normal appearing s/p irrigation, nose and mouth without ulcers or lesions, tonsils clear without exudate, mildly erythematous but not edematous, no exudate.  NECK: no adenopathy, no asymmetry, masses, or scars and thyroid normal to palpation  RESP: lungs clear to auscultation - no rales, rhonchi or wheezes  CV: regular rate and rhythm, normal S1 S2, no S3 or S4, no murmur, click or rub, no peripheral edema and peripheral pulses strong  ABDOMEN: soft, nontender, no hepatosplenomegaly, no masses and bowel sounds normal  MS: no gross musculoskeletal defects noted, no edema  SKIN: no suspicious rashes; approximately 1.5 cm raised erythematous papule consistent with insect bite--surrounding erythema and painful to palpation, no streaking of erythema.   PSYCH: mentation appears normal, affect normal/bright    Discussed strep swab--centor score of 1, low likelihood, declines at this time.    ----- Service Performed and Documented by Resident or Fellow ------

## 2024-03-03 ENCOUNTER — HEALTH MAINTENANCE LETTER (OUTPATIENT)
Age: 49
End: 2024-03-03

## 2024-07-18 ENCOUNTER — TELEPHONE (OUTPATIENT)
Dept: INTERNAL MEDICINE | Facility: CLINIC | Age: 49
End: 2024-07-18
Payer: COMMERCIAL

## 2024-07-18 NOTE — TELEPHONE ENCOUNTER
Reason for Call:  Appointment Request    Patient requesting this type of appt:  ear pain and hard to hear for a week    Requested provider:  any    Reason patient unable to be scheduled: Not within requested timeframe    When does patient want to be seen/preferred time: Same day    Comments: patient wondering if he could be worked in today    Could we send this information to you in [employer] or would you prefer to receive a phone call?:   Patient would prefer a phone call   Okay to leave a detailed message?: Yes at Home number on file [phone] (home)    Call taken on 7/18/2024 at 12:28 PM by Duyen Story

## 2024-07-18 NOTE — TELEPHONE ENCOUNTER
Called patient. There are no openings in primary care today or tomorrow. Offered nurse triage, but patient prefers to go to Walk-In Care today.

## 2024-11-20 ENCOUNTER — ALLIED HEALTH/NURSE VISIT (OUTPATIENT)
Dept: FAMILY MEDICINE | Facility: CLINIC | Age: 49
End: 2024-11-20
Payer: COMMERCIAL

## 2024-11-20 DIAGNOSIS — Z23 NEEDS FLU SHOT: Primary | ICD-10-CM

## 2024-11-20 PROCEDURE — 99207 PR NO CHARGE NURSE ONLY: CPT

## 2024-11-20 PROCEDURE — 90471 IMMUNIZATION ADMIN: CPT

## 2024-11-20 PROCEDURE — 90673 RIV3 VACCINE NO PRESERV IM: CPT

## 2025-03-15 ENCOUNTER — HEALTH MAINTENANCE LETTER (OUTPATIENT)
Age: 50
End: 2025-03-15

## 2025-06-11 ENCOUNTER — OFFICE VISIT (OUTPATIENT)
Dept: URGENT CARE | Facility: URGENT CARE | Age: 50
End: 2025-06-11
Payer: COMMERCIAL

## 2025-06-11 VITALS
TEMPERATURE: 97.9 F | BODY MASS INDEX: 29.51 KG/M2 | DIASTOLIC BLOOD PRESSURE: 85 MMHG | SYSTOLIC BLOOD PRESSURE: 125 MMHG | OXYGEN SATURATION: 95 % | WEIGHT: 185.6 LBS | HEART RATE: 71 BPM | RESPIRATION RATE: 16 BRPM

## 2025-06-11 DIAGNOSIS — J01.00 ACUTE NON-RECURRENT MAXILLARY SINUSITIS: Primary | ICD-10-CM

## 2025-06-11 RX ORDER — PREDNISONE 20 MG/1
40 TABLET ORAL DAILY
Qty: 10 TABLET | Refills: 0 | Status: SHIPPED | OUTPATIENT
Start: 2025-06-11 | End: 2025-06-16

## 2025-06-11 RX ORDER — PREDNISONE 20 MG/1
40 TABLET ORAL DAILY
Qty: 10 TABLET | Refills: 0 | Status: SHIPPED | OUTPATIENT
Start: 2025-06-11 | End: 2025-06-11

## 2025-06-11 NOTE — PROGRESS NOTES
Patient presents with:  Sinus Problem: X 2wks ago, has not been sleeping well last 3 days, some headaches and dizziness, no fever (has not taken temp)      Clinical Decision Making:      ICD-10-CM    1. Acute non-recurrent maxillary sinusitis  J01.00 predniSONE (DELTASONE) 20 MG tablet     amoxicillin-clavulanate (AUGMENTIN) 875-125 MG tablet          Patient Instructions     Over-the-counter nasal steroid spray, follow packaging directions  Over-the-counter Mucinex, follow packaging directions  Hot packs 3 times per day to the forehead and face over the tender sinuses  Nasal saline irrigation or Pretty Ricci for congestion  Antibiotic as written below.    Follow up with primary care provider if you do not get resolution with the course of treatment.  Return to walk-in care if complication or new symptoms arise in the interim.      HPI:  Nicolas Villar is a 49 year old male who presents today complaining of ***    History obtained from {source of history:451779}.    Problem List:  2023-05: Mild persistent asthma, unspecified whether complicated      No past medical history on file.    Social History     Tobacco Use    Smoking status: Former    Smokeless tobacco: Never   Substance Use Topics    Alcohol use: Not on file       Review of Systems  As above in HPI otherwise negative.    Vitals:    06/11/25 1627   BP: 125/85   Pulse: 71   Resp: 16   Temp: 97.9  F (36.6  C)   TempSrc: Oral   SpO2: 95%   Weight: 84.2 kg (185 lb 9.6 oz)       General: Patient is resting comfortably no acute distress is afebrile  HEENT: Head is normocephalic atraumatic   eyes are PERRL EOMI sclera anicteric   TMs are clear bilaterally  Throat is with mild pharyngeal wall erythema and no exudate  No cervical lymphadenopathy present  LUNGS: Clear to auscultation bilaterally  HEART: Regular rate and rhythm  Skin: Without rash non-diaphoretic    Physical Exam      Labs:  Results for orders placed or performed in visit on 05/23/22   COVID-19 Virus  (Coronavirus) by PCR Nose     Status: Abnormal    Specimen: Nose; Swab   Result Value Ref Range    SARS CoV2 PCR Positive (A) Negative, Testing sent to reference lab. Results will be returned via unsolicited result    Narrative    Testing was performed using the Xpert Xpress SARS-CoV-2 Assay on the  Cepheid Gene-Xpert Instrument Systems. Additional information about  this Emergency Use Authorization (EUA) assay can be found via the Lab  Guide. This test should be ordered for the detection of SARS-CoV-2 in  individuals who meet SARS-CoV-2 clinical and/or epidemiological  criteria. Test performance is unknown in asymptomatic patients. This  test is for in vitro diagnostic use under the FDA EUA for  laboratories certified under CLIA to perform high complexity testing.  This test has not been FDA cleared or approved. A negative result  does not rule out the presence of PCR inhibitors in the specimen or  target RNA in concentration below the limit of detection for the  assay. The possibility of a false negative should be considered if  the patient's recent exposure or clinical presentation suggests  COVID-19. This test was validated by the [facility]. This laboratory is certified under  the Clinical Laboratory Improvement Amendments of 1988 (CLIA-88) as  qualified to perform high complexity laboratory testing.         Radiology:  I have personally ordered and preliminarily reviewed the following xray, I have noted ***    No results found.     At the end of the encounter, I discussed results, diagnosis, medications. Discussed red flags for immediate return to clinic/ER, as well as indications for follow up if no improvement. Patient understood and agreed to plan. Patient was stable for discharge.

## 2025-06-11 NOTE — PATIENT INSTRUCTIONS
Over-the-counter nasal steroid spray, follow packaging directions  Over-the-counter Mucinex, follow packaging directions  Hot packs 3 times per day to the forehead and face over the tender sinuses  Nasal saline irrigation or Pretty Ricci for congestion  Antibiotic as written below.    Follow up with primary care provider if you do not get resolution with the course of treatment.  Return to walk-in care if complication or new symptoms arise in the interim.

## 2025-06-11 NOTE — PROGRESS NOTES
Urgent Care Clinic Visit  {Rapid Rooming (Optional):434228}  Chief Complaint   Patient presents with    Sinus Problem     X 2wks ago, has not been sleeping well last 3 days, some headaches and dizziness, no fever (has not taken temp)               6/11/2025     4:26 PM   Additional Questions   Roomed by Patty COSME   Accompanied by Self     Patty Fritz on 6/11/2025 at 4:27 PM

## 2025-08-25 ENCOUNTER — OFFICE VISIT (OUTPATIENT)
Dept: URGENT CARE | Facility: URGENT CARE | Age: 50
End: 2025-08-25
Payer: COMMERCIAL

## 2025-08-25 VITALS
HEART RATE: 65 BPM | RESPIRATION RATE: 18 BRPM | OXYGEN SATURATION: 98 % | TEMPERATURE: 97.7 F | WEIGHT: 189.4 LBS | BODY MASS INDEX: 30.11 KG/M2

## 2025-08-25 DIAGNOSIS — J01.00 ACUTE NON-RECURRENT MAXILLARY SINUSITIS: Primary | ICD-10-CM

## 2025-08-25 PROCEDURE — 99213 OFFICE O/P EST LOW 20 MIN: CPT | Performed by: PHYSICIAN ASSISTANT

## 2025-08-25 RX ORDER — PREDNISONE 20 MG/1
40 TABLET ORAL DAILY
Qty: 10 TABLET | Refills: 0 | Status: SHIPPED | OUTPATIENT
Start: 2025-08-25 | End: 2025-08-30